# Patient Record
Sex: MALE | Race: WHITE | NOT HISPANIC OR LATINO | Employment: FULL TIME | URBAN - METROPOLITAN AREA
[De-identification: names, ages, dates, MRNs, and addresses within clinical notes are randomized per-mention and may not be internally consistent; named-entity substitution may affect disease eponyms.]

---

## 2021-06-09 ENCOUNTER — OFFICE VISIT (OUTPATIENT)
Dept: URGENT CARE | Facility: CLINIC | Age: 51
End: 2021-06-09
Payer: COMMERCIAL

## 2021-06-09 ENCOUNTER — APPOINTMENT (OUTPATIENT)
Dept: RADIOLOGY | Facility: CLINIC | Age: 51
End: 2021-06-09
Payer: COMMERCIAL

## 2021-06-09 VITALS
DIASTOLIC BLOOD PRESSURE: 95 MMHG | TEMPERATURE: 97.6 F | BODY MASS INDEX: 28.23 KG/M2 | OXYGEN SATURATION: 96 % | HEART RATE: 93 BPM | RESPIRATION RATE: 18 BRPM | SYSTOLIC BLOOD PRESSURE: 166 MMHG | WEIGHT: 220 LBS | HEIGHT: 74 IN

## 2021-06-09 DIAGNOSIS — M20.022 BOUTONNIERE DEFORMITY OF FINGER, LEFT: Primary | ICD-10-CM

## 2021-06-09 DIAGNOSIS — S69.92XA INJURY OF FINGER OF LEFT HAND, INITIAL ENCOUNTER: ICD-10-CM

## 2021-06-09 PROCEDURE — 73140 X-RAY EXAM OF FINGER(S): CPT

## 2021-06-09 PROCEDURE — 99203 OFFICE O/P NEW LOW 30 MIN: CPT | Performed by: PHYSICIAN ASSISTANT

## 2021-06-09 PROCEDURE — 29130 APPL FINGER SPLINT STATIC: CPT | Performed by: PHYSICIAN ASSISTANT

## 2021-06-09 RX ORDER — CITALOPRAM 20 MG/1
10 TABLET ORAL DAILY
COMMUNITY
Start: 2021-03-11

## 2021-06-09 RX ORDER — BENAZEPRIL HYDROCHLORIDE 20 MG/1
20 TABLET ORAL DAILY
COMMUNITY
Start: 2021-03-11

## 2021-06-09 NOTE — PROGRESS NOTES
[address]        NAME: Obey Oates is a 48 y o  male  : 1970    MRN: [id number]  DATE: 2021  TIME: 9:20 AM    Assessment and Plan   Boutonniere deformity of finger, left [M20 022]  1  Boutonniere deformity of finger, left  XR finger left third digit-middle    Ambulatory referral to Hand Surgery    Splint application         Patient Instructions   Wear finger splint at all times  Make appoint with hand surgery  Ice  Over-the-counter Tylenol ibuprofen for pain  Follow up with PCP in 3-5 days  Proceed to  ER if symptoms worsen  Chief Complaint     Chief Complaint   Patient presents with    Finger Injury     2 weeks ago pt was jumping off a rope swing and rope got wrapped around left middle finger  thought it would improve on its own but is getting worse         History of Present Illness       Patient is a 42-year-old male with significant past medical history of hypertension presents the office complaining of left middle finger pain for 2 weeks  Patient states he was jump being off a rope swing when his finger got caught on the rope  Pain is located to the PIP  described as 8/10 throbbing which is worse with flexion and use of finger  Reports he has difficulty with full extension and full flexion  He has associated swelling but no ecchymosis, numbness, or tingling  He has tried ibuprofen with some relief  States he was also wearing his homemade splint but the dog was chewing on it  Denies prior injuries to finger  Patient thought it would resolve on its own but his pain is getting worse  Review of Systems   Review of Systems   Musculoskeletal: Positive for arthralgias and joint swelling  Neurological: Negative for numbness           Current Medications       Current Outpatient Medications:     benazepril (LOTENSIN) 20 mg tablet, Take 20 mg by mouth daily, Disp: , Rfl:     citalopram (CeleXA) 20 mg tablet, Take 10 mg by mouth daily, Disp: , Rfl:     Current Allergies Allergies as of 06/09/2021    (No Known Allergies)            The following portions of the patient's history were reviewed and updated as appropriate: allergies, current medications, past family history, past medical history, past social history, past surgical history and problem list      Past Medical History:   Diagnosis Date    Hypertension        Past Surgical History:   Procedure Laterality Date    HERNIA REPAIR      SHOULDER SURGERY         No family history on file  Medications have been verified  Objective   /95   Pulse 93   Temp 97 6 °F (36 4 °C)   Resp 18   Ht 6' 2" (1 88 m)   Wt 99 8 kg (220 lb)   SpO2 96%   BMI 28 25 kg/m²   No LMP for male patient  Physical Exam     Physical Exam  Vitals signs and nursing note reviewed  Constitutional:       Appearance: He is well-developed  HENT:      Head: Normocephalic and atraumatic  Right Ear: External ear normal       Left Ear: External ear normal       Nose: Nose normal    Eyes:      General: Lids are normal       Conjunctiva/sclera: Conjunctivae normal    Musculoskeletal:      Comments: Left middle finger:  Significant swelling over the PIP  Tenderness to palpation of same  Boutonniere deformity  Unable to fully extend  Flexion 25% due to pain  Unable to determine FDP function d/t pain  Neurovascular intact  Skin:     General: Skin is warm and dry  Capillary Refill: Capillary refill takes less than 2 seconds  Neurological:      Mental Status: He is alert  Left finger x-ray:  No evidence of acute osseous abnormalities  Boutonniere deformity  Radiology interpretation pending  Splint application    Date/Time: 6/9/2021 9:18 AM  Performed by: Dulce Echevarria PA-C  Authorized by: Dulce Echevarria PA-C   Universal Protocol:  Consent: Verbal consent obtained    Risks and benefits: risks, benefits and alternatives were discussed  Consent given by: patient  Time out: Immediately prior to procedure a "time out" was called to verify the correct patient, procedure, equipment, support staff and site/side marked as required  Timeout called at: 6/9/2021 9:19 AM   Patient understanding: patient states understanding of the procedure being performed  Patient consent: the patient's understanding of the procedure matches consent given      Pre-procedure details:     Sensation:  Normal  Procedure details:     Laterality:  Left    Location:  Finger    Finger:  L long finger    Splint type:  Finger splint, static  Post-procedure details:     Pain:  Unchanged    Sensation:  Normal    Patient tolerance of procedure:   Tolerated well, no immediate complications

## 2021-06-09 NOTE — PATIENT INSTRUCTIONS
Wear finger splint at all times  Make appoint with hand surgery  Ice  Over-the-counter Tylenol ibuprofen for pain  Go to ER if symptoms become severe

## 2021-06-11 ENCOUNTER — OFFICE VISIT (OUTPATIENT)
Dept: OBGYN CLINIC | Facility: CLINIC | Age: 51
End: 2021-06-11
Payer: COMMERCIAL

## 2021-06-11 VITALS — WEIGHT: 220 LBS | BODY MASS INDEX: 28.23 KG/M2 | HEIGHT: 74 IN

## 2021-06-11 DIAGNOSIS — M20.022 BOUTONNIERE DEFORMITY OF FINGER, LEFT: Primary | ICD-10-CM

## 2021-06-11 PROCEDURE — 99203 OFFICE O/P NEW LOW 30 MIN: CPT | Performed by: ORTHOPAEDIC SURGERY

## 2021-06-11 NOTE — LETTER
June 11, 2021     Nicolas Kanner, MD [address]    Patient: Meg Cornell   YOB: 1970   Date of Visit: 6/11/2021       Dear Dr Bob Saleem:    Thank you for referring Meg Cornell to me for evaluation  Below are my notes for this consultation  If you have questions, please do not hesitate to call me  I look forward to following your patient along with you  Sincerely,        Khanh Mary        CC: No Recipients  Khanh Mary  6/11/2021 10:03 AM  Signed  ASSESSMENT/PLAN:    Diagnoses and all orders for this visit:    Boutonniere deformity of finger, left  -     Ambulatory referral to Hand Surgery  -     Ambulatory referral to Hand Surgery; Future      Josh was informed that he will likely require surgery for this issue and was referred to Dr Livan Jimenez  We will see him on an as needed basis  He may continue the Alumafoam splint as needed for comfort  He was offered a note for work but he declined  He was encouraged to contact the office should questions or concerns arise  Return if symptoms worsen or fail to improve  Follow-up with Hand Surgery         _____________________________________________________  CHIEF COMPLAINT:  Chief Complaint   Patient presents with    Left Middle Finger - Injury         SUBJECTIVE:  Meg Cornell is a 48 y o  male who presents  For evaluation  Of left middle finger  He reports over 2 weeks ago, he was swinging on a rope swing when the  Rope slipped  He noted immediate pain and deformity but thought it would get better on its own  When it did not, he presented to Urgent Care  X-rays were obtained and he was diagnosed with a boutonniere deformity  He was provided an Alumafoam splint which he has been wearing at all times  He works for [employer] and does admit that he has been struggling to work due to the deformity  He denies numbness or tingling  He denies prior injuries or trauma    He reports that he uses his right hand for writing and eating but uses the left upper extremity for sporting activities  PAST MEDICAL HISTORY:  Past Medical History:   Diagnosis Date    Hypertension        PAST SURGICAL HISTORY:  Past Surgical History:   Procedure Laterality Date    HERNIA REPAIR      SHOULDER SURGERY         FAMILY HISTORY:  Family History   Problem Relation Age of Onset    No Known Problems Mother     No Known Problems Father        SOCIAL HISTORY:  Social History     Tobacco Use    Smoking status: Never Smoker    Smokeless tobacco: Current User     Types: Chew   Substance Use Topics    Alcohol use: Yes    Drug use: Never       MEDICATIONS:    Current Outpatient Medications:     benazepril (LOTENSIN) 20 mg tablet, Take 20 mg by mouth daily, Disp: , Rfl:     citalopram (CeleXA) 20 mg tablet, Take 10 mg by mouth daily, Disp: , Rfl:     ALLERGIES:  Allergies   Allergen Reactions    Naproxen Other (See Comments)     Aleve-head numbness    Oxycodone-Acetaminophen Itching       Review of systems:   Constitutional: Negative for fatigue, fever or loss of apetite  HENT: Negative  Respiratory: Negative for shortness of breath, dyspnea  Cardiovascular: Negative for chest pain/tightness  Gastrointestinal: Negative for abdominal pain, N/V  Endocrine: Negative for cold/heat intolerance, unexplained weight loss/gain  Genitourinary: Negative for flank pain, dysuria, hematuria  Musculoskeletal:  Positive as stated in the HPI  Skin: Negative for rash  Neurological:   Negative for numbness and tingling  Psychiatric/Behavioral: Negative for agitation  _____________________________________________________  PHYSICAL EXAMINATION:    Height 6' 2" (1 88 m), weight 99 8 kg (220 lb)      General: well developed and well nourished, alert, oriented times 3 and appears comfortable  Psychiatric: Normal  HEENT: Benign  Cardiovascular: Regular    Pulmonary: No wheezing or stridor  Abdomen: Soft, Nontender  Skin: No masses, erythema, lacerations, fluctation, ulcerations  Neurovascular: Motor and sensory exams are grossly intact, 2+ radial pulse  Limb is warm well perfused with good color and capillary refill of the digits  MUSCULOSKELETAL EXAMINATION:      The left middle finger exam demonstrates the alumafoam splint to be in place with Coban and upon arrival, this was removed by the patient without difficulty  He has obvious boutonniere deformity of the middle finger, unable to actively extend the PIP or flex the PIP  Passively, I am able to flex the DIP and slightly extend the PIP  Full ROM of the other digits without issue  Full wrist and elbow motion  The remainder of the left upper extremity exam is benign  _____________________________________________________  STUDIES REVIEWED:  I have personally reviewed pertinent films and reports in PACS  XRs of the left hand performed in urgent care on 6/9/21 demonstrates  Boutonniere deformity of the left middle finger  No acute osseous abnormalities  PROCEDURES PERFORMED:  Procedures  None performed today      Michael Flaherty

## 2021-06-11 NOTE — PROGRESS NOTES
ASSESSMENT/PLAN:    Diagnoses and all orders for this visit:    Boutonniere deformity of finger, left  -     Ambulatory referral to Hand Surgery  -     Ambulatory referral to Hand Surgery; Future      Josh was informed that he will likely require surgery for this issue and was referred to Dr Constantino Chen  We will see him on an as needed basis  He may continue the Alumafoam splint as needed for comfort  He was offered a note for work but he declined  He was encouraged to contact the office should questions or concerns arise  Return if symptoms worsen or fail to improve  Follow-up with Hand Surgery         _____________________________________________________  CHIEF COMPLAINT:  Chief Complaint   Patient presents with    Left Middle Finger - Injury         SUBJECTIVE:  Michelle Newsome is a 48 y o  male who presents  For evaluation  Of left middle finger  He reports over 2 weeks ago, he was swinging on a rope swing when the  Rope slipped  He noted immediate pain and deformity but thought it would get better on its own  When it did not, he presented to Urgent Care  X-rays were obtained and he was diagnosed with a boutonniere deformity  He was provided an Alumafoam splint which he has been wearing at all times  He works for [employer] and does admit that he has been struggling to work due to the deformity  He denies numbness or tingling  He denies prior injuries or trauma  He reports that he uses his right hand for writing and eating but uses the left upper extremity for sporting activities      PAST MEDICAL HISTORY:  Past Medical History:   Diagnosis Date    Hypertension        PAST SURGICAL HISTORY:  Past Surgical History:   Procedure Laterality Date    HERNIA REPAIR      SHOULDER SURGERY         FAMILY HISTORY:  Family History   Problem Relation Age of Onset    No Known Problems Mother     No Known Problems Father        SOCIAL HISTORY:  Social History     Tobacco Use    Smoking status: Never Smoker    Smokeless tobacco: Current User     Types: Chew   Substance Use Topics    Alcohol use: Yes    Drug use: Never       MEDICATIONS:    Current Outpatient Medications:     benazepril (LOTENSIN) 20 mg tablet, Take 20 mg by mouth daily, Disp: , Rfl:     citalopram (CeleXA) 20 mg tablet, Take 10 mg by mouth daily, Disp: , Rfl:     ALLERGIES:  Allergies   Allergen Reactions    Naproxen Other (See Comments)     Aleve-head numbness    Oxycodone-Acetaminophen Itching       Review of systems:   Constitutional: Negative for fatigue, fever or loss of apetite  HENT: Negative  Respiratory: Negative for shortness of breath, dyspnea  Cardiovascular: Negative for chest pain/tightness  Gastrointestinal: Negative for abdominal pain, N/V  Endocrine: Negative for cold/heat intolerance, unexplained weight loss/gain  Genitourinary: Negative for flank pain, dysuria, hematuria  Musculoskeletal:  Positive as stated in the HPI  Skin: Negative for rash  Neurological:   Negative for numbness and tingling  Psychiatric/Behavioral: Negative for agitation  _____________________________________________________  PHYSICAL EXAMINATION:    Height 6' 2" (1 88 m), weight 99 8 kg (220 lb)  General: well developed and well nourished, alert, oriented times 3 and appears comfortable  Psychiatric: Normal  HEENT: Benign  Cardiovascular: Regular    Pulmonary: No wheezing or stridor  Abdomen: Soft, Nontender  Skin: No masses, erythema, lacerations, fluctation, ulcerations  Neurovascular: Motor and sensory exams are grossly intact, 2+ radial pulse  Limb is warm well perfused with good color and capillary refill of the digits  MUSCULOSKELETAL EXAMINATION:      The left middle finger exam demonstrates the alumafoam splint to be in place with Coban and upon arrival, this was removed by the patient without difficulty  He has obvious boutonniere deformity of the middle finger, unable to actively extend the PIP or flex the PIP  Passively, I am able to flex the DIP and slightly extend the PIP  Full ROM of the other digits without issue  Full wrist and elbow motion  The remainder of the left upper extremity exam is benign  _____________________________________________________  STUDIES REVIEWED:  I have personally reviewed pertinent films and reports in PACS  XRs of the left hand performed in urgent care on 6/9/21 demonstrates  Boutonniere deformity of the left middle finger  No acute osseous abnormalities  PROCEDURES PERFORMED:  Procedures  None performed today      Blackwood Blanca

## 2021-06-12 ENCOUNTER — TELEPHONE (OUTPATIENT)
Dept: OBGYN CLINIC | Facility: MEDICAL CENTER | Age: 51
End: 2021-06-12

## 2021-06-12 NOTE — TELEPHONE ENCOUNTER
Left message for patient to call to schedule appointment with Dr Brenda Moralez    Left my direct contact #

## 2021-06-12 NOTE — TELEPHONE ENCOUNTER
Patient is calling back in from a missed call, he is going to listen to message about appointment and reach out to practice admin directly

## 2021-06-15 ENCOUNTER — OFFICE VISIT (OUTPATIENT)
Dept: OBGYN CLINIC | Facility: MEDICAL CENTER | Age: 51
End: 2021-06-15
Payer: COMMERCIAL

## 2021-06-15 VITALS — HEIGHT: 74 IN | WEIGHT: 220 LBS | BODY MASS INDEX: 28.23 KG/M2

## 2021-06-15 DIAGNOSIS — M20.022 BOUTONNIERE DEFORMITY OF FINGER, LEFT: Primary | ICD-10-CM

## 2021-06-15 PROCEDURE — 99244 OFF/OP CNSLTJ NEW/EST MOD 40: CPT | Performed by: ORTHOPAEDIC SURGERY

## 2021-06-15 NOTE — PROGRESS NOTES
Chief Complaint     Left middle finger pain    History of Present Illness     Rubio Everett is a 48 y o  right and dominant male  who presents to the office today for an evaluation of his left middle finger  I am seeing him in consultation at the request of Maurisio ANN  Patient states he sustained an injury to the left middle finger about 3-4 weeks ago when he was swinging from a rope swing  He notes the rope was wrapped around the finger and the notch from the rope slid through his hand injuring his middle finger  He notes immediate pain to the middle finger after injury as well as deformity  Today he has most pain at the PIP joint with the inability to fully extend at the PIP joint  Patient does state his pain has improved over the last few weeks  He notes no numbness or tingling  He is currently working as a   Past Medical History:   Diagnosis Date    Hypertension        Past Surgical History:   Procedure Laterality Date    HERNIA REPAIR      SHOULDER SURGERY         Allergies   Allergen Reactions    Naproxen Other (See Comments)     Aleve-head numbness    Oxycodone-Acetaminophen Itching       Current Outpatient Medications on File Prior to Visit   Medication Sig Dispense Refill    benazepril (LOTENSIN) 20 mg tablet Take 20 mg by mouth daily      citalopram (CeleXA) 20 mg tablet Take 10 mg by mouth daily       No current facility-administered medications on file prior to visit  Social History     Tobacco Use    Smoking status: Never Smoker    Smokeless tobacco: Current User     Types: Chew   Vaping Use    Vaping Use: Never used   Substance Use Topics    Alcohol use: Yes    Drug use: Never       Family History   Problem Relation Age of Onset    No Known Problems Mother     No Known Problems Father        Review of Systems     As stated in the HPI  All other systems were reviewed and are negative        Physical Exam     Ht 6' 2" (1 88 m)   Wt 99 8 kg (220 lb)   BMI 28 25 kg/m²     GENERAL: This is a well-developed, well-nourished, age-appropriate patient in no acute distress  The patient is alert and oriented x3  Pleasant and cooperative  Eyes: Anicteric sclerae  Extraocular movements appear intact  HENT: Nares are patent with no drainage  Lungs: There is equal chest rise on inspection  Breathing is non-labored with no audible wheezing  Cardiovascular: No cyanosis  No upper extremity lymphadema  Skin: Skin is warm to touch  No obvious skin lesions or rashes other than described below  Neurologic: No ataxia  Psychiatric: Mood and affect are appropriate  Left middle finger   Skin intact   No erythema or ecchymosis noted   Swelling at the PIP joint  Flexion deformity at the PIP joint with inability to actively extend there  Compensatory hyperextension at the D IP  Passive motion also slightly limited secondary to pain   DPC 1  EDC intact   Tenderness at PIP joint  positive Jacinto test   Sensation intact to the radial and ulnar aspect of the digits   Brisk capillary refill noted     Data Review     Labs:  None     Electrodiagnostic Testing:  None     Imaging:    X-rays of the left middle finger obtained on 06/09/2021 were personally reviewed by me in the office demonstrate no acute fracture    Assessment and Plan      Diagnoses and all orders for this visit:    Boutonniere deformity of finger, left  -     Ambulatory referral to PT/OT hand therapy; Future           47 y/o male with left middle finger boutonniere deformity likely secondary to acute central slip rupture  Patient discussed non operative treatment of hand therapy for passive range of motion and static extension splints to be worn at night  We discussed and during the daytime he should perform Coban wrapping  In order to undergo any surgical intervention in the future he will need to have full passive motion of the PIP joint    I also discussed nonoperative care in terms of extension splinting at the PIP joint for 6 weeks, but the patient does not even have passive motion that allows him to extend out straight at this point so we would not be successful in doing this and the patient notes that he cannot wear splint for 6 weeks straight given his work  We did discuss surgical intervention of dorsalization is not indicated at this time and that we could perform this surgical intervention at any time in the future if he is unhappy with the deformity once he regains his passive range of motion  I will see him back in 6 weeks time for re-evaluation          Follow Up: 6 weeks     To Do Next Visit: re-evaluate     PROCEDURES PERFORMED:  Procedures  No Procedures performed today    Scribe Attestation    I,:  Antonio Estrada MA am acting as a scribe while in the presence of the attending physician :       I,:  Carrie Clemons MD personally performed the services described in this documentation    as scribed in my presence :

## 2021-06-15 NOTE — PATIENT INSTRUCTIONS
Leonard Sotelo   Certified Hand Therapist    [address] [place] [address] [address] [facility]  Phone: [phone]      Fax: [phone]    Map and Details  0 00 mi

## 2021-06-15 NOTE — LETTER
Rosi 15, 2021     Dearl MD Jannette Jackson [address]    Patient: Candis Larson   YOB: 1970   Date of Visit: 6/15/2021       Dear Dr Racquel Falcon:    Thank you for referring Candis Larson to me for evaluation  Below are my notes for this consultation  If you have questions, please do not hesitate to call me  I look forward to following your patient along with you  Sincerely,        Denis Burrell MD        CC: No Recipients  Denis Burrell MD  6/15/2021 12:58 PM  Signed  Chief Complaint     Left middle finger pain    History of Present Illness     Candis Larson is a 48 y o  right and dominant male  who presents to the office today for an evaluation of his left middle finger  I am seeing him in consultation at the request of Laura ANN  Patient states he sustained an injury to the left middle finger about 3-4 weeks ago when he was swinging from a rope swing  He notes the rope was wrapped around the finger and the notch from the rope slid through his hand injuring his middle finger  He notes immediate pain to the middle finger after injury as well as deformity  Today he has most pain at the PIP joint with the inability to fully extend at the PIP joint  Patient does state his pain has improved over the last few weeks  He notes no numbness or tingling  He is currently working as a          Past Medical History:   Diagnosis Date    Hypertension        Past Surgical History:   Procedure Laterality Date    HERNIA REPAIR      SHOULDER SURGERY         Allergies   Allergen Reactions    Naproxen Other (See Comments)     Aleve-head numbness    Oxycodone-Acetaminophen Itching       Current Outpatient Medications on File Prior to Visit   Medication Sig Dispense Refill    benazepril (LOTENSIN) 20 mg tablet Take 20 mg by mouth daily      citalopram (CeleXA) 20 mg tablet Take 10 mg by mouth daily       No current facility-administered medications on file prior to visit  Social History     Tobacco Use    Smoking status: Never Smoker    Smokeless tobacco: Current User     Types: Chew   Vaping Use    Vaping Use: Never used   Substance Use Topics    Alcohol use: Yes    Drug use: Never       Family History   Problem Relation Age of Onset    No Known Problems Mother     No Known Problems Father        Review of Systems     As stated in the HPI  All other systems were reviewed and are negative  Physical Exam     Ht 6' 2" (1 88 m)   Wt 99 8 kg (220 lb)   BMI 28 25 kg/m²     GENERAL: This is a well-developed, well-nourished, age-appropriate patient in no acute distress  The patient is alert and oriented x3  Pleasant and cooperative  Eyes: Anicteric sclerae  Extraocular movements appear intact  HENT: Nares are patent with no drainage  Lungs: There is equal chest rise on inspection  Breathing is non-labored with no audible wheezing  Cardiovascular: No cyanosis  No upper extremity lymphadema  Skin: Skin is warm to touch  No obvious skin lesions or rashes other than described below  Neurologic: No ataxia  Psychiatric: Mood and affect are appropriate  Left middle finger   Skin intact   No erythema or ecchymosis noted   Swelling at the PIP joint  Flexion deformity at the PIP joint with inability to actively extend there  Compensatory hyperextension at the D IP    Passive motion also slightly limited secondary to pain   DPC 1  EDC intact   Tenderness at PIP joint  positive Jacinto test   Sensation intact to the radial and ulnar aspect of the digits   Brisk capillary refill noted     Data Review     Labs:  None     Electrodiagnostic Testing:  None     Imaging:    X-rays of the left middle finger obtained on 06/09/2021 were personally reviewed by me in the office demonstrate no acute fracture    Assessment and Plan      Diagnoses and all orders for this visit:    Boutonniere deformity of finger, left  -     Ambulatory referral to PT/OT hand therapy; Future           47 y/o male with left middle finger boutonniere deformity likely secondary to acute central slip rupture  Patient discussed non operative treatment of hand therapy for passive range of motion and static extension splints to be worn at night  We discussed and during the daytime he should perform Coban wrapping  In order to undergo any surgical intervention in the future he will need to have full passive motion of the PIP joint  I also discussed nonoperative care in terms of extension splinting at the PIP joint for 6 weeks, but the patient does not even have passive motion that allows him to extend out straight at this point so we would not be successful in doing this and the patient notes that he cannot wear splint for 6 weeks straight given his work  We did discuss surgical intervention of dorsalization is not indicated at this time and that we could perform this surgical intervention at any time in the future if he is unhappy with the deformity once he regains his passive range of motion  I will see him back in 6 weeks time for re-evaluation          Follow Up: 6 weeks     To Do Next Visit: re-evaluate     PROCEDURES PERFORMED:  Procedures  No Procedures performed today    Scribe Attestation    I,:  Stefany Dennison MA am acting as a scribe while in the presence of the attending physician :       I,:  Rito Mccord MD personally performed the services described in this documentation    as scribed in my presence :

## 2021-07-27 ENCOUNTER — OFFICE VISIT (OUTPATIENT)
Dept: OBGYN CLINIC | Facility: MEDICAL CENTER | Age: 51
End: 2021-07-27
Payer: COMMERCIAL

## 2021-07-27 VITALS — BODY MASS INDEX: 27.72 KG/M2 | WEIGHT: 216 LBS | HEIGHT: 74 IN

## 2021-07-27 DIAGNOSIS — M20.022 BOUTONNIERE DEFORMITY OF FINGER, LEFT: Primary | ICD-10-CM

## 2021-07-27 PROCEDURE — 99214 OFFICE O/P EST MOD 30 MIN: CPT | Performed by: ORTHOPAEDIC SURGERY

## 2021-07-27 NOTE — PROGRESS NOTES
Chief Complaint     Left middle finger deformity      History of Present Illness     Shamir Brand is a 46 y o  right hand dominant male who presents today for follow up of left middle finger Boutonniere deformity  Patient states he learned exercises from a family member who is a therapist and has been performing these  States he tried to use a splint while at work but this broke and he states he can't wear these while at work  He states that the finger still bothers him and causes him pain  He continues to not have the ability to extend at the PIP joint  Denies numbness/tingling  Patient is currently  working as a   Has to use wheelbarrows that can contain 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 weight  Past Medical History:   Diagnosis Date    Hypertension        Past Surgical History:   Procedure Laterality Date    HERNIA REPAIR      SHOULDER SURGERY         Allergies   Allergen Reactions    Naproxen Other (See Comments)     Aleve-head numbness    Oxycodone-Acetaminophen Itching       Current Outpatient Medications on File Prior to Visit   Medication Sig Dispense Refill    benazepril (LOTENSIN) 20 mg tablet Take 20 mg by mouth daily      citalopram (CeleXA) 20 mg tablet Take 10 mg by mouth daily       No current facility-administered medications on file prior to visit  Social History     Tobacco Use    Smoking status: Never Smoker    Smokeless tobacco: Current User     Types: Chew   Vaping Use    Vaping Use: Never used   Substance Use Topics    Alcohol use: Yes    Drug use: Never       Family History   Problem Relation Age of Onset    No Known Problems Mother     No Known Problems Father        Review of Systems     As stated in the HPI  All other systems were reviewed and are negative  Physical Exam     Ht 6' 2" (1 88 m)   Wt 98 kg (216 lb)   BMI 27 73 kg/m²     GENERAL: This is a well-developed, well-nourished, age-appropriate patient in no acute distress   The patient is alert and oriented x3  Pleasant and cooperative  Eyes: Anicteric sclerae  Extraocular movements appear intact  HENT: Nares are patent with no drainage  Lungs: There is equal chest rise on inspection  Breathing is non-labored with no audible wheezing  Cardiovascular: No cyanosis  No upper extremity lymphadema  Skin: Skin is warm to touch  No obvious skin lesions or rashes other than described below  Neurologic: No ataxia  Psychiatric: Mood and affect are appropriate  Left Upper Extremity:  Patient with persistent Boutonniere deformity  Patient flexed at the PIP joint, sitting at approximately 40 degrees  Patient cannot actively extend this joint  Passively, I can extend patient to approximately 10-15 degrees  He has tenderness to palpation of the PIP joint  Tenderness to palpation of A2 into A1  Positive Jacinto test   DPC 0  Sensation intact to radial/ulnar finger  Brisk capillary refill  Data Review     Labs:  None    Electrodiagnostic Testing:  None    Imaging:  None today    Assessment and Plan      Diagnoses and all orders for this visit:    Boutonniere deformity of finger, left  -     MRI finger left wo contrast; Future       46year old male with left middle finger Boutonniere deformity  I discussed with the patient today that since he has significant discomfort on the volar aspect of his finger as well, I would like to get an MRI to rule out the possibility of a pulley rupture as contributing to his condition  I again explained how this also could be due to a central slip rupture as well and discussed the details of both of these injuries with the patient today  I again explained that therapy and splinting are advised at this time  He will benefit from full passive PIP motion if surgical intervention is to help in the future  After the MRI, patient should follow up for discussion of the results as well as further treatment plan  Follow Up:  After testing    To Do Next Visit: Go over MRI results    PROCEDURES PERFORMED:  Procedures  No Procedures performed today

## 2021-08-02 ENCOUNTER — HOSPITAL ENCOUNTER (OUTPATIENT)
Dept: MRI IMAGING | Facility: HOSPITAL | Age: 51
Discharge: HOME | End: 2021-08-02
Payer: COMMERCIAL

## 2021-08-02 DIAGNOSIS — M20.022 BOUTONNIERE DEFORMITY OF FINGER, LEFT: ICD-10-CM

## 2021-08-02 PROCEDURE — G1004 CDSM NDSC: HCPCS

## 2021-08-02 PROCEDURE — 73218 MRI UPPER EXTREMITY W/O DYE: CPT

## 2021-08-11 ENCOUNTER — OFFICE VISIT (OUTPATIENT)
Dept: OBGYN CLINIC | Facility: MEDICAL CENTER | Age: 51
End: 2021-08-11
Payer: COMMERCIAL

## 2021-08-11 VITALS — WEIGHT: 216 LBS | BODY MASS INDEX: 27.72 KG/M2 | HEIGHT: 74 IN

## 2021-08-11 DIAGNOSIS — M20.022 BOUTONNIERE DEFORMITY OF FINGER, LEFT: Primary | ICD-10-CM

## 2021-08-11 PROCEDURE — 99214 OFFICE O/P EST MOD 30 MIN: CPT | Performed by: ORTHOPAEDIC SURGERY

## 2021-08-11 NOTE — PROGRESS NOTES
Chief Complaint     Left middle finger deformity      History of Present Illness     Bella Martinez is a 46 y o  right hand dominant male who presents to the office today for follow up of left middle finger Boutonniere deformity  Patient presents here today for follow up of his MRI  He states he has not had any changes since his last visit  He continues to have pain and swelling in the PIP joint and still cannot extend his finger  Patient learned exercises from a family member who is a therapist and tried to use a what sounds like an Alumafoam splint, but this would always bend when he wore it at work  Patient is currently  working as a   Has to use wheelbarrows that can contain 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 of weight  Past Medical History:   Diagnosis Date    Hypertension        Past Surgical History:   Procedure Laterality Date    HERNIA REPAIR      SHOULDER SURGERY         Allergies   Allergen Reactions    Naproxen Other (See Comments)     Aleve-head numbness    Oxycodone-Acetaminophen Itching       Current Outpatient Medications on File Prior to Visit   Medication Sig Dispense Refill    benazepril (LOTENSIN) 20 mg tablet Take 20 mg by mouth daily      citalopram (CeleXA) 20 mg tablet Take 10 mg by mouth daily       No current facility-administered medications on file prior to visit  Social History     Tobacco Use    Smoking status: Never Smoker    Smokeless tobacco: Current User     Types: Chew   Vaping Use    Vaping Use: Never used   Substance Use Topics    Alcohol use: Yes    Drug use: Never       Family History   Problem Relation Age of Onset    No Known Problems Mother     No Known Problems Father        Review of Systems     As stated in the HPI  All other systems were reviewed and are negative  Physical Exam     Ht 6' 2" (1 88 m)   Wt 98 kg (216 lb)   BMI 27 73 kg/m²     GENERAL: This is a well-developed, well-nourished, age-appropriate patient in no acute distress   The patient is alert and oriented x3  Pleasant and cooperative  Eyes: Anicteric sclerae  Extraocular movements appear intact  HENT: Nares are patent with no drainage  Lungs: There is equal chest rise on inspection  Breathing is non-labored with no audible wheezing  Cardiovascular: No cyanosis  No upper extremity lymphadema  Skin: Skin is warm to touch  No obvious skin lesions or rashes other than described below  Neurologic: No ataxia  Psychiatric: Mood and affect are appropriate  Left Upper Extremity:  Patient with Boutonniere deformity of the long finger  Passively can extend to 10-15 degrees  Patient is not able to actively extend at this joint  He has tenderness to palpation of the PIP joint  Tenderness to palpation along the A2 pulley  Positive Jacinto test   DPC just over 0  Sensation intact to radial/ulnar finger  Brisk capillary refill  Data Review     Labs:  None    Electrodiagnostic Testing:  None    Imaging:  MRI of the left middle finger from 08/02/2021 was personally reviewed by me and demonstrates a central slip tear as well as palmar drift of the flexor tendon suggestive of A2 pulley rupture    Assessment and Plan      Diagnoses and all orders for this visit:    Boutonniere deformity of finger, left         46year old male with left middle finger Boutonniere deformity from central slip rupture as well as A2 pulley rupture  I discussed patient's MRI results with him today in detail  I explained that since the patient is still symptomatic, that it would be reasonable to pursue further treatment  However, I explained that we can't proceed with surgery until the joint is more flexible and can extend passively  For this, recommend that he pursue splinting made by a hand therapist  This could include static progressive casting/splinting versus a turnbuckle splint to work on finger extension as well as a pulley ring     I did explain to the patient that because I will be leaving the practice, he will need to follow up with one of my partners for further treatment based off the success of his splinting  Patient states understanding and agreement with treatment plan  Of note, patient repeatedly asks if it is worth it to pursue any surgical management  Patient is able to perform all activities of daily living as well as work responsibilities and seems to have significant hesitancy about pursuing operative intervention with a long-term recovery      Follow Up: 6 weeks with one of my partners    To Do Next Visit: Reevaluate motion and symptoms    PROCEDURES PERFORMED:  Procedures  No Procedures performed today